# Patient Record
Sex: FEMALE | Race: WHITE | NOT HISPANIC OR LATINO
[De-identification: names, ages, dates, MRNs, and addresses within clinical notes are randomized per-mention and may not be internally consistent; named-entity substitution may affect disease eponyms.]

---

## 2022-04-02 ENCOUNTER — NON-APPOINTMENT (OUTPATIENT)
Age: 77
End: 2022-04-02

## 2022-04-18 PROBLEM — Z00.00 ENCOUNTER FOR PREVENTIVE HEALTH EXAMINATION: Status: ACTIVE | Noted: 2022-04-18

## 2022-04-19 ENCOUNTER — APPOINTMENT (OUTPATIENT)
Dept: OPHTHALMOLOGY | Facility: CLINIC | Age: 77
End: 2022-04-19
Payer: MEDICARE

## 2022-04-19 ENCOUNTER — NON-APPOINTMENT (OUTPATIENT)
Age: 77
End: 2022-04-19

## 2022-04-19 PROCEDURE — 92134 CPTRZ OPH DX IMG PST SGM RTA: CPT

## 2022-04-19 PROCEDURE — 92004 COMPRE OPH EXAM NEW PT 1/>: CPT

## 2022-06-21 ENCOUNTER — APPOINTMENT (OUTPATIENT)
Dept: OPHTHALMOLOGY | Facility: CLINIC | Age: 77
End: 2022-06-21
Payer: MEDICARE

## 2022-06-21 ENCOUNTER — APPOINTMENT (OUTPATIENT)
Dept: OPHTHALMOLOGY | Facility: CLINIC | Age: 77
End: 2022-06-21

## 2022-06-21 ENCOUNTER — NON-APPOINTMENT (OUTPATIENT)
Age: 77
End: 2022-06-21

## 2022-06-21 PROCEDURE — 92012 INTRM OPH EXAM EST PATIENT: CPT

## 2022-06-26 RX ORDER — SODIUM CHLORIDE 9 MG/ML
1000 INJECTION, SOLUTION INTRAVENOUS
Refills: 0 | Status: DISCONTINUED | OUTPATIENT
Start: 2022-06-29 | End: 2022-06-29

## 2022-06-26 NOTE — OPERATIVE REPORT - OPERATIVE RPOSRT DETAILS
DATE OF SURGERY:6-29-22    Surgeon:  Dolores Shane    PRE-OP DIAGNOSIS: Cataract Right Eye    POST-OP DIAGNOSIS: Same    ANESTHESIA: MAC    PROCEDURE: Cataract extraction with intraocular lens implant Right eye    SPECIMEN/TISSUE REMOVED: None    ESTIMATED BLOOD LOSS: < 1mL    COMPLICATIONS: None    The patient was brought to the operating room.  A drop of topical anesthetic was placed in the eye. The patient was prepped and draped in the usual sterile fashion, including Betadine drops in the eye. A lid speculum was placed between the lids of the right eye. A paracentesis was made superiorly. Intracameral preservative free lidocaine was instilled and the anterior chamber was filled with air, trypan blue, and viscoelastic. A clear cornea temporal incision was created using a 2.4mm keratome. A continuous curvilinear capsulorhexis was started with a cystotome and completed with capsulorhexis forceps. The lens was hydrodissected with BSS. The lens was then phacoemulsified using a divide and conquer technique. Residual cortical material was removed using automated irrigation and aspiration. The capsular bag was reformed using a viscoelastic. A SA60WF 20.00 lens was inserted into the bag. Symmetric capsular bag fixation was confirmed. The remaining viscoelastic was removed with automated irrigation and aspiration. The wounds were hydrated and checked to be water tight. The lid speculum was removed. Antibiotic/steroid ointment was instilled in the eye and a shield was placed over the eye. The patient left the OR in stable condition having tolerated the procedure well. Dolores MEYER was present throughout the case.

## 2022-06-28 NOTE — ASU PATIENT PROFILE, ADULT - NSICDXPASTMEDICALHX_GEN_ALL_CORE_FT
PAST MEDICAL HISTORY:  Multiple thyroid nodules     Osteoporosis     Stage 3 chronic kidney disease

## 2022-06-28 NOTE — ASU PATIENT PROFILE, ADULT - NSICDXPASTSURGICALHX_GEN_ALL_CORE_FT
PAST SURGICAL HISTORY:  H/O hernia repair     History of dilation and curettage     S/P thyroid biopsy      PAST SURGICAL HISTORY:  H/O breast biopsy     H/O hernia repair     H/O melanoma excision     History of dilation and curettage     S/P thyroid biopsy

## 2022-06-29 ENCOUNTER — OUTPATIENT (OUTPATIENT)
Dept: OUTPATIENT SERVICES | Facility: HOSPITAL | Age: 77
LOS: 1 days | Discharge: HOME | End: 2022-06-29

## 2022-06-29 ENCOUNTER — TRANSCRIPTION ENCOUNTER (OUTPATIENT)
Age: 77
End: 2022-06-29

## 2022-06-29 ENCOUNTER — NON-APPOINTMENT (OUTPATIENT)
Age: 77
End: 2022-06-29

## 2022-06-29 ENCOUNTER — APPOINTMENT (OUTPATIENT)
Dept: OPHTHALMOLOGY | Facility: AMBULATORY SURGERY CENTER | Age: 77
End: 2022-06-29

## 2022-06-29 VITALS
RESPIRATION RATE: 14 BRPM | HEIGHT: 59 IN | TEMPERATURE: 98 F | WEIGHT: 119.05 LBS | SYSTOLIC BLOOD PRESSURE: 138 MMHG | HEART RATE: 82 BPM | DIASTOLIC BLOOD PRESSURE: 71 MMHG | OXYGEN SATURATION: 99 %

## 2022-06-29 VITALS
RESPIRATION RATE: 16 BRPM | OXYGEN SATURATION: 99 % | DIASTOLIC BLOOD PRESSURE: 73 MMHG | SYSTOLIC BLOOD PRESSURE: 129 MMHG | TEMPERATURE: 97 F | HEART RATE: 63 BPM

## 2022-06-29 DIAGNOSIS — Z98.890 OTHER SPECIFIED POSTPROCEDURAL STATES: Chronic | ICD-10-CM

## 2022-06-29 DIAGNOSIS — C43.9 MALIGNANT MELANOMA OF SKIN, UNSPECIFIED: Chronic | ICD-10-CM

## 2022-06-29 PROCEDURE — 66984 XCAPSL CTRC RMVL W/O ECP: CPT | Mod: RT

## 2022-06-29 DEVICE — LENS IOL ACRYSOF NAT CLR SA60WF 20.0D
Type: IMPLANTABLE DEVICE | Site: RIGHT EYE | Status: NON-FUNCTIONAL
Removed: 2022-06-29

## 2022-06-29 RX ORDER — PHENYLEPHRINE HCL 2.5 %
1 DROPS OPHTHALMIC (EYE)
Refills: 0 | Status: COMPLETED | OUTPATIENT
Start: 2022-06-29 | End: 2022-06-29

## 2022-06-29 RX ORDER — ACETAMINOPHEN 500 MG
650 TABLET ORAL ONCE
Refills: 0 | Status: DISCONTINUED | OUTPATIENT
Start: 2022-06-29 | End: 2022-06-29

## 2022-06-29 RX ORDER — ONDANSETRON 8 MG/1
4 TABLET, FILM COATED ORAL ONCE
Refills: 0 | Status: DISCONTINUED | OUTPATIENT
Start: 2022-06-29 | End: 2022-06-29

## 2022-06-29 RX ORDER — TROPICAMIDE 1 %
1 DROPS OPHTHALMIC (EYE)
Refills: 0 | Status: COMPLETED | OUTPATIENT
Start: 2022-06-29 | End: 2022-06-29

## 2022-06-29 RX ORDER — OFLOXACIN 0.3 %
1 DROPS OPHTHALMIC (EYE)
Refills: 0 | Status: COMPLETED | OUTPATIENT
Start: 2022-06-29 | End: 2022-06-29

## 2022-06-29 RX ORDER — CYCLOPENTOLATE HYDROCHLORIDE 10 MG/ML
1 SOLUTION/ DROPS OPHTHALMIC
Refills: 0 | Status: COMPLETED | OUTPATIENT
Start: 2022-06-29 | End: 2022-06-29

## 2022-06-29 RX ORDER — KETOROLAC TROMETHAMINE 0.5 %
1 DROPS OPHTHALMIC (EYE)
Refills: 0 | Status: COMPLETED | OUTPATIENT
Start: 2022-06-29 | End: 2022-06-29

## 2022-06-29 RX ADMIN — CYCLOPENTOLATE HYDROCHLORIDE 1 DROP(S): 10 SOLUTION/ DROPS OPHTHALMIC at 07:14

## 2022-06-29 RX ADMIN — Medication 1 DROP(S): at 07:14

## 2022-06-29 RX ADMIN — Medication 1 DROP(S): at 07:15

## 2022-06-29 RX ADMIN — Medication 1 DROP(S): at 07:22

## 2022-06-29 RX ADMIN — Medication 1 DROP(S): at 07:17

## 2022-06-29 RX ADMIN — CYCLOPENTOLATE HYDROCHLORIDE 1 DROP(S): 10 SOLUTION/ DROPS OPHTHALMIC at 07:15

## 2022-06-29 RX ADMIN — CYCLOPENTOLATE HYDROCHLORIDE 1 DROP(S): 10 SOLUTION/ DROPS OPHTHALMIC at 07:17

## 2022-06-30 ENCOUNTER — NON-APPOINTMENT (OUTPATIENT)
Age: 77
End: 2022-06-30

## 2022-06-30 ENCOUNTER — APPOINTMENT (OUTPATIENT)
Dept: OPHTHALMOLOGY | Facility: CLINIC | Age: 77
End: 2022-06-30

## 2022-06-30 ENCOUNTER — TRANSCRIPTION ENCOUNTER (OUTPATIENT)
Age: 77
End: 2022-06-30

## 2022-06-30 PROCEDURE — 99024 POSTOP FOLLOW-UP VISIT: CPT

## 2022-07-02 DIAGNOSIS — N18.31 CHRONIC KIDNEY DISEASE, STAGE 3A: ICD-10-CM

## 2022-07-02 DIAGNOSIS — H25.11 AGE-RELATED NUCLEAR CATARACT, RIGHT EYE: ICD-10-CM

## 2022-07-02 DIAGNOSIS — Z85.828 PERSONAL HISTORY OF OTHER MALIGNANT NEOPLASM OF SKIN: ICD-10-CM

## 2022-07-02 DIAGNOSIS — H26.9 UNSPECIFIED CATARACT: ICD-10-CM

## 2022-07-02 DIAGNOSIS — E04.9 NONTOXIC GOITER, UNSPECIFIED: ICD-10-CM

## 2022-07-02 DIAGNOSIS — M81.0 AGE-RELATED OSTEOPOROSIS WITHOUT CURRENT PATHOLOGICAL FRACTURE: ICD-10-CM

## 2022-07-02 DIAGNOSIS — Z88.0 ALLERGY STATUS TO PENICILLIN: ICD-10-CM

## 2022-07-06 ENCOUNTER — APPOINTMENT (OUTPATIENT)
Dept: OPHTHALMOLOGY | Facility: CLINIC | Age: 77
End: 2022-07-06

## 2022-07-06 ENCOUNTER — NON-APPOINTMENT (OUTPATIENT)
Age: 77
End: 2022-07-06

## 2022-07-06 PROCEDURE — 99024 POSTOP FOLLOW-UP VISIT: CPT

## 2022-07-07 PROBLEM — E04.2 NONTOXIC MULTINODULAR GOITER: Chronic | Status: ACTIVE | Noted: 2022-06-29

## 2022-07-07 PROBLEM — M81.0 AGE-RELATED OSTEOPOROSIS WITHOUT CURRENT PATHOLOGICAL FRACTURE: Chronic | Status: ACTIVE | Noted: 2022-06-29

## 2022-07-07 PROBLEM — N18.30 CHRONIC KIDNEY DISEASE, STAGE 3 UNSPECIFIED: Chronic | Status: ACTIVE | Noted: 2022-06-29

## 2022-07-18 ENCOUNTER — NON-APPOINTMENT (OUTPATIENT)
Age: 77
End: 2022-07-18

## 2022-07-18 ENCOUNTER — APPOINTMENT (OUTPATIENT)
Dept: OPHTHALMOLOGY | Facility: CLINIC | Age: 77
End: 2022-07-18

## 2022-07-18 PROCEDURE — 99024 POSTOP FOLLOW-UP VISIT: CPT

## 2022-07-27 ENCOUNTER — APPOINTMENT (OUTPATIENT)
Dept: OPHTHALMOLOGY | Facility: CLINIC | Age: 77
End: 2022-07-27

## 2022-07-27 ENCOUNTER — NON-APPOINTMENT (OUTPATIENT)
Age: 77
End: 2022-07-27

## 2022-07-27 PROCEDURE — 99024 POSTOP FOLLOW-UP VISIT: CPT

## 2022-08-10 ENCOUNTER — NON-APPOINTMENT (OUTPATIENT)
Age: 77
End: 2022-08-10

## 2022-08-10 ENCOUNTER — APPOINTMENT (OUTPATIENT)
Dept: OPHTHALMOLOGY | Facility: CLINIC | Age: 77
End: 2022-08-10

## 2022-08-10 PROCEDURE — 92012 INTRM OPH EXAM EST PATIENT: CPT

## 2022-08-26 ENCOUNTER — NON-APPOINTMENT (OUTPATIENT)
Age: 77
End: 2022-08-26

## 2022-08-26 ENCOUNTER — APPOINTMENT (OUTPATIENT)
Dept: OPHTHALMOLOGY | Facility: CLINIC | Age: 77
End: 2022-08-26

## 2022-08-26 PROCEDURE — 99024 POSTOP FOLLOW-UP VISIT: CPT

## 2022-08-28 NOTE — OPERATIVE REPORT - OPERATIVE RPOSRT DETAILS
DATE OF SURGERY:8-31-22    Surgeon:  Dolores Shane    PRE-OP DIAGNOSIS: Cataract Left Eye    POST-OP DIAGNOSIS: Same    ANESTHESIA: MAC    PROCEDURE: Cataract extraction with intraocular lens implant left eye    SPECIMEN/TISSUE REMOVED: None    ESTIMATED BLOOD LOSS: < 1mL    COMPLICATIONS: None    The patient was brought to the operating room.  A drop of topical anesthetic was placed in the eye. The patient was prepped and draped in the usual sterile fashion, including Betadine drops in the eye. A lid speculum was placed between the lids of the left eye. A paracentesis was made inferior. Intracameral preservative free lidocaine was instilled and the anterior chamber was filled with air, trypan blue, and viscoelastic.. A clear cornea temporal incision was created using a 2.4mm keratome. A continuous curvilinear capsulorhexis was started with a cystotome and completed with capsulorhexis forceps. The lens was hydrodissected with BSS. The lens was then phacoemulsified using a divide and conquer technique. Residual cortical material was removed using automated irrigation and aspiration. The capsular bag was reformed using a viscoelastic. A SA60WF 20.00 lens was inserted into the bag. Symmetric capsular bag fixation was confirmed. The remaining viscoelastic was removed with automated irrigation and aspiration. The wounds were hydrated and checked to be water tight. The lid speculum was removed. Antibiotic/steroid ointment was instilled in the eye and a shield was placed over the eye. The patient left the OR in stable condition having tolerated the procedure well. IDolores was present throughout the case.

## 2022-08-29 RX ORDER — ALENDRONATE SODIUM 70 MG/1
0 TABLET ORAL
Qty: 0 | Refills: 0 | DISCHARGE

## 2022-08-29 NOTE — ASU PATIENT PROFILE, ADULT - NSICDXPASTSURGICALHX_GEN_ALL_CORE_FT
PAST SURGICAL HISTORY:  H/O breast biopsy     H/O hernia repair     H/O melanoma excision     History of dilation and curettage     S/P thyroid biopsy

## 2022-08-29 NOTE — ASU PATIENT PROFILE, ADULT - VISION (WITH CORRECTIVE LENSES IF THE PATIENT USUALLY WEARS THEM):
Eye glasses/Partially impaired: cannot see medication labels or newsprint, but can see obstacles in path, and the surrounding layout; can count fingers at arm's length

## 2022-08-29 NOTE — ASU PATIENT PROFILE, ADULT - NS PRO AD PATIENT TYPE
Health Care Proxy (HCP)/Do Not Resuscitate (DNR)/Medical Orders for Life-Sustaining Treatment (MOLST)/Living Will/Power of   Piotr/Health Care Proxy (HCP)/Do Not Resuscitate (DNR)/Medical Orders for Life-Sustaining Treatment (MOLST)/Living Will/Power of

## 2022-08-31 ENCOUNTER — OUTPATIENT (OUTPATIENT)
Dept: OUTPATIENT SERVICES | Facility: HOSPITAL | Age: 77
LOS: 1 days | Discharge: HOME | End: 2022-08-31

## 2022-08-31 ENCOUNTER — TRANSCRIPTION ENCOUNTER (OUTPATIENT)
Age: 77
End: 2022-08-31

## 2022-08-31 ENCOUNTER — NON-APPOINTMENT (OUTPATIENT)
Age: 77
End: 2022-08-31

## 2022-08-31 ENCOUNTER — APPOINTMENT (OUTPATIENT)
Dept: OPHTHALMOLOGY | Facility: AMBULATORY SURGERY CENTER | Age: 77
End: 2022-08-31

## 2022-08-31 VITALS
TEMPERATURE: 97 F | DIASTOLIC BLOOD PRESSURE: 65 MMHG | RESPIRATION RATE: 16 BRPM | SYSTOLIC BLOOD PRESSURE: 122 MMHG | HEART RATE: 60 BPM | OXYGEN SATURATION: 97 %

## 2022-08-31 VITALS
OXYGEN SATURATION: 98 % | WEIGHT: 119.05 LBS | DIASTOLIC BLOOD PRESSURE: 67 MMHG | RESPIRATION RATE: 14 BRPM | HEIGHT: 59 IN | HEART RATE: 67 BPM | SYSTOLIC BLOOD PRESSURE: 120 MMHG | TEMPERATURE: 98 F

## 2022-08-31 DIAGNOSIS — Z98.890 OTHER SPECIFIED POSTPROCEDURAL STATES: Chronic | ICD-10-CM

## 2022-08-31 PROCEDURE — 92136 OPHTHALMIC BIOMETRY: CPT | Mod: 26,LT

## 2022-08-31 PROCEDURE — 66984 XCAPSL CTRC RMVL W/O ECP: CPT | Mod: 79,LT

## 2022-08-31 DEVICE — LENS IOL ACRYSOF NAT CLR SA60WF 20.0D
Type: IMPLANTABLE DEVICE | Site: LEFT | Status: NON-FUNCTIONAL
Removed: 2022-08-31

## 2022-08-31 RX ORDER — KETOROLAC TROMETHAMINE 0.5 %
1 DROPS OPHTHALMIC (EYE)
Refills: 0 | Status: COMPLETED | OUTPATIENT
Start: 2022-08-31 | End: 2022-08-31

## 2022-08-31 RX ORDER — OFLOXACIN 0.3 %
1 DROPS OPHTHALMIC (EYE)
Refills: 0 | Status: COMPLETED | OUTPATIENT
Start: 2022-08-31 | End: 2022-08-31

## 2022-08-31 RX ORDER — PREGABALIN 225 MG/1
1000 CAPSULE ORAL
Qty: 0 | Refills: 0 | DISCHARGE

## 2022-08-31 RX ORDER — TIMOLOL 0.5 %
1 DROPS OPHTHALMIC (EYE)
Qty: 0 | Refills: 0 | DISCHARGE

## 2022-08-31 RX ORDER — CYCLOPENTOLATE HYDROCHLORIDE 10 MG/ML
1 SOLUTION/ DROPS OPHTHALMIC
Refills: 0 | Status: COMPLETED | OUTPATIENT
Start: 2022-08-31 | End: 2022-08-31

## 2022-08-31 RX ORDER — ACETAMINOPHEN 500 MG
650 TABLET ORAL ONCE
Refills: 0 | Status: DISCONTINUED | OUTPATIENT
Start: 2022-08-31 | End: 2022-08-31

## 2022-08-31 RX ORDER — SODIUM CHLORIDE 9 MG/ML
1000 INJECTION, SOLUTION INTRAVENOUS
Refills: 0 | Status: DISCONTINUED | OUTPATIENT
Start: 2022-08-31 | End: 2022-08-31

## 2022-08-31 RX ORDER — ASCORBIC ACID 60 MG
1 TABLET,CHEWABLE ORAL
Qty: 0 | Refills: 0 | DISCHARGE

## 2022-08-31 RX ORDER — TROPICAMIDE 1 %
1 DROPS OPHTHALMIC (EYE)
Refills: 0 | Status: COMPLETED | OUTPATIENT
Start: 2022-08-31 | End: 2022-08-31

## 2022-08-31 RX ORDER — PHENYLEPHRINE HCL 2.5 %
1 DROPS OPHTHALMIC (EYE)
Refills: 0 | Status: COMPLETED | OUTPATIENT
Start: 2022-08-31 | End: 2022-08-31

## 2022-08-31 RX ORDER — ONDANSETRON 8 MG/1
4 TABLET, FILM COATED ORAL ONCE
Refills: 0 | Status: DISCONTINUED | OUTPATIENT
Start: 2022-08-31 | End: 2022-08-31

## 2022-08-31 RX ORDER — CHOLECALCIFEROL (VITAMIN D3) 125 MCG
0 CAPSULE ORAL
Qty: 0 | Refills: 0 | DISCHARGE

## 2022-08-31 RX ORDER — ALENDRONATE SODIUM 70 MG/1
5 TABLET ORAL
Qty: 0 | Refills: 0 | DISCHARGE

## 2022-08-31 RX ADMIN — Medication 1 DROP(S): at 07:23

## 2022-08-31 RX ADMIN — Medication 1 DROP(S): at 07:12

## 2022-08-31 RX ADMIN — Medication 1 DROP(S): at 07:24

## 2022-08-31 RX ADMIN — CYCLOPENTOLATE HYDROCHLORIDE 1 DROP(S): 10 SOLUTION/ DROPS OPHTHALMIC at 07:31

## 2022-08-31 RX ADMIN — CYCLOPENTOLATE HYDROCHLORIDE 1 DROP(S): 10 SOLUTION/ DROPS OPHTHALMIC at 07:12

## 2022-08-31 RX ADMIN — CYCLOPENTOLATE HYDROCHLORIDE 1 DROP(S): 10 SOLUTION/ DROPS OPHTHALMIC at 07:22

## 2022-08-31 RX ADMIN — Medication 1 DROP(S): at 07:13

## 2022-08-31 NOTE — ASU DISCHARGE PLAN (ADULT/PEDIATRIC) - NS MD DC FALL RISK RISK
For information on Fall & Injury Prevention, visit: [email or web address] OR  [email or web address] OR  https://www.cdc.gov/steadi/patient.html

## 2022-09-01 ENCOUNTER — NON-APPOINTMENT (OUTPATIENT)
Age: 77
End: 2022-09-01

## 2022-09-01 ENCOUNTER — APPOINTMENT (OUTPATIENT)
Dept: OPHTHALMOLOGY | Facility: CLINIC | Age: 77
End: 2022-09-01

## 2022-09-01 PROCEDURE — 99024 POSTOP FOLLOW-UP VISIT: CPT

## 2022-09-06 ENCOUNTER — NON-APPOINTMENT (OUTPATIENT)
Age: 77
End: 2022-09-06

## 2022-09-06 ENCOUNTER — APPOINTMENT (OUTPATIENT)
Dept: OPHTHALMOLOGY | Facility: CLINIC | Age: 77
End: 2022-09-06

## 2022-09-06 PROCEDURE — 99024 POSTOP FOLLOW-UP VISIT: CPT

## 2022-09-07 ENCOUNTER — APPOINTMENT (OUTPATIENT)
Dept: OPHTHALMOLOGY | Facility: CLINIC | Age: 77
End: 2022-09-07

## 2022-10-11 ENCOUNTER — NON-APPOINTMENT (OUTPATIENT)
Age: 77
End: 2022-10-11

## 2022-10-11 ENCOUNTER — APPOINTMENT (OUTPATIENT)
Dept: OPHTHALMOLOGY | Facility: CLINIC | Age: 77
End: 2022-10-11

## 2022-10-11 PROCEDURE — 92012 INTRM OPH EXAM EST PATIENT: CPT | Mod: 24

## 2023-02-07 ENCOUNTER — APPOINTMENT (OUTPATIENT)
Dept: OPHTHALMOLOGY | Facility: CLINIC | Age: 78
End: 2023-02-07
Payer: MEDICARE

## 2023-02-07 ENCOUNTER — NON-APPOINTMENT (OUTPATIENT)
Age: 78
End: 2023-02-07

## 2023-02-07 PROCEDURE — 92012 INTRM OPH EXAM EST PATIENT: CPT

## 2023-02-07 PROCEDURE — 92083 EXTENDED VISUAL FIELD XM: CPT

## 2023-06-06 ENCOUNTER — APPOINTMENT (OUTPATIENT)
Dept: OPHTHALMOLOGY | Facility: CLINIC | Age: 78
End: 2023-06-06
Payer: MEDICARE

## 2023-06-06 ENCOUNTER — NON-APPOINTMENT (OUTPATIENT)
Age: 78
End: 2023-06-06

## 2023-06-06 PROCEDURE — 92012 INTRM OPH EXAM EST PATIENT: CPT

## 2023-06-06 PROCEDURE — 92083 EXTENDED VISUAL FIELD XM: CPT

## 2023-06-12 ENCOUNTER — NON-APPOINTMENT (OUTPATIENT)
Age: 78
End: 2023-06-12

## 2023-06-12 ENCOUNTER — APPOINTMENT (OUTPATIENT)
Dept: OPHTHALMOLOGY | Facility: CLINIC | Age: 78
End: 2023-06-12
Payer: MEDICARE

## 2023-06-12 PROCEDURE — 92012 INTRM OPH EXAM EST PATIENT: CPT

## 2023-10-03 ENCOUNTER — APPOINTMENT (OUTPATIENT)
Dept: OPHTHALMOLOGY | Facility: CLINIC | Age: 78
End: 2023-10-03
Payer: MEDICARE

## 2023-10-03 ENCOUNTER — NON-APPOINTMENT (OUTPATIENT)
Age: 78
End: 2023-10-03

## 2023-10-03 PROCEDURE — 92012 INTRM OPH EXAM EST PATIENT: CPT

## 2023-11-07 ENCOUNTER — APPOINTMENT (OUTPATIENT)
Dept: OPHTHALMOLOGY | Facility: CLINIC | Age: 78
End: 2023-11-07
Payer: MEDICARE

## 2023-11-07 ENCOUNTER — NON-APPOINTMENT (OUTPATIENT)
Age: 78
End: 2023-11-07

## 2023-11-07 PROCEDURE — 92014 COMPRE OPH EXAM EST PT 1/>: CPT

## 2023-11-07 PROCEDURE — 92133 CPTRZD OPH DX IMG PST SGM ON: CPT

## 2024-02-27 ENCOUNTER — APPOINTMENT (OUTPATIENT)
Dept: OPHTHALMOLOGY | Facility: CLINIC | Age: 79
End: 2024-02-27
Payer: MEDICARE

## 2024-02-27 ENCOUNTER — NON-APPOINTMENT (OUTPATIENT)
Age: 79
End: 2024-02-27

## 2024-02-27 PROCEDURE — 92012 INTRM OPH EXAM EST PATIENT: CPT | Mod: 25

## 2024-02-27 PROCEDURE — 67820 REVISE EYELASHES: CPT | Mod: RT

## 2024-02-27 PROCEDURE — 92083 EXTENDED VISUAL FIELD XM: CPT

## 2024-03-11 ENCOUNTER — APPOINTMENT (OUTPATIENT)
Dept: OPHTHALMOLOGY | Facility: CLINIC | Age: 79
End: 2024-03-11
Payer: MEDICARE

## 2024-03-11 ENCOUNTER — NON-APPOINTMENT (OUTPATIENT)
Age: 79
End: 2024-03-11

## 2024-03-11 PROCEDURE — 92014 COMPRE OPH EXAM EST PT 1/>: CPT

## 2024-03-11 PROCEDURE — 92083 EXTENDED VISUAL FIELD XM: CPT

## 2024-03-11 PROCEDURE — 92133 CPTRZD OPH DX IMG PST SGM ON: CPT

## 2024-03-11 PROCEDURE — 92004 COMPRE OPH EXAM NEW PT 1/>: CPT

## 2024-03-21 ENCOUNTER — OUTPATIENT (OUTPATIENT)
Dept: OUTPATIENT SERVICES | Facility: HOSPITAL | Age: 79
LOS: 1 days | End: 2024-03-21

## 2024-03-21 ENCOUNTER — RESULT REVIEW (OUTPATIENT)
Age: 79
End: 2024-03-21

## 2024-03-21 ENCOUNTER — APPOINTMENT (OUTPATIENT)
Dept: MRI IMAGING | Facility: CLINIC | Age: 79
End: 2024-03-21
Payer: MEDICARE

## 2024-03-21 DIAGNOSIS — Z98.890 OTHER SPECIFIED POSTPROCEDURAL STATES: Chronic | ICD-10-CM

## 2024-03-21 PROCEDURE — 70543 MRI ORBT/FAC/NCK W/O &W/DYE: CPT | Mod: 26,MH

## 2024-03-21 PROCEDURE — 70553 MRI BRAIN STEM W/O & W/DYE: CPT | Mod: 26,MH

## 2024-09-27 NOTE — ASU PATIENT PROFILE, ADULT - PAIN CHRONIC, PROFILE
We have done a CT scan without dye due to your history of contrast-induced nephropathy.  There are no emergent conditions identified in the noncontrast CT scan.  Since the abdominal pain came on today please follow-up with your regular doctor to discuss further workup if you develop fever greater than 100.4 or pain is constant and progressively worse return to the emergency room to reevaluate gallbladder and/or CT with IV dye.  Thanks for your patience today.   no

## 2024-10-29 ENCOUNTER — NON-APPOINTMENT (OUTPATIENT)
Age: 79
End: 2024-10-29

## 2024-10-29 ENCOUNTER — APPOINTMENT (OUTPATIENT)
Dept: OPHTHALMOLOGY | Facility: CLINIC | Age: 79
End: 2024-10-29
Payer: MEDICARE

## 2024-10-29 PROCEDURE — 92133 CPTRZD OPH DX IMG PST SGM ON: CPT

## 2024-10-29 PROCEDURE — 92014 COMPRE OPH EXAM EST PT 1/>: CPT

## 2025-04-01 ENCOUNTER — NON-APPOINTMENT (OUTPATIENT)
Age: 80
End: 2025-04-01

## 2025-04-01 ENCOUNTER — APPOINTMENT (OUTPATIENT)
Dept: OPHTHALMOLOGY | Facility: CLINIC | Age: 80
End: 2025-04-01
Payer: MEDICARE

## 2025-04-01 PROCEDURE — 92083 EXTENDED VISUAL FIELD XM: CPT

## 2025-04-01 PROCEDURE — 92133 CPTRZD OPH DX IMG PST SGM ON: CPT

## 2025-04-01 PROCEDURE — 92014 COMPRE OPH EXAM EST PT 1/>: CPT

## 2025-07-15 ENCOUNTER — NON-APPOINTMENT (OUTPATIENT)
Age: 80
End: 2025-07-15

## 2025-07-15 ENCOUNTER — APPOINTMENT (OUTPATIENT)
Dept: OPHTHALMOLOGY | Facility: CLINIC | Age: 80
End: 2025-07-15
Payer: MEDICARE

## 2025-07-15 PROCEDURE — 92012 INTRM OPH EXAM EST PATIENT: CPT

## (undated) DEVICE — DRAPE MICROSCOPE KNOB COVER SMALL (2 PCS)

## (undated) DEVICE — TRANSFORMER INTREPID I/A 0.3MM

## (undated) DEVICE — SOL IRR BAL SALT 500ML

## (undated) DEVICE — CANNULA IRR ANT CHAMBER 30G

## (undated) DEVICE — SUT NYLON 10-0 12" CU-5

## (undated) DEVICE — TIP OZIL 12 DEGREE MINI FLARE

## (undated) DEVICE — Device

## (undated) DEVICE — PACK CENTURION 2.4MM

## (undated) DEVICE — SOL SYR OPHTHALMIC VISION BLUE TRYPAN BLUE 0.06% 0.5 ML

## (undated) DEVICE — GOWN SLEEVES

## (undated) DEVICE — KNIFE ALCON PARACENTESIS CLEARCUT SIDEPORT 1MM (YELLOW)

## (undated) DEVICE — GLV 6.5 PROTEXIS W HYDROGEL

## (undated) DEVICE — PACK ANTERIOR SEGMENT